# Patient Record
Sex: MALE
[De-identification: names, ages, dates, MRNs, and addresses within clinical notes are randomized per-mention and may not be internally consistent; named-entity substitution may affect disease eponyms.]

---

## 2018-03-26 ENCOUNTER — HOSPITAL ENCOUNTER (INPATIENT)
Dept: HOSPITAL 12 - SRC | Age: 58
LOS: 5 days | Discharge: OTHER INSTITUTION | DRG: 982 | End: 2018-03-31
Attending: INTERNAL MEDICINE | Admitting: INTERNAL MEDICINE
Payer: COMMERCIAL

## 2018-03-26 VITALS — HEIGHT: 72 IN | BODY MASS INDEX: 31.15 KG/M2 | WEIGHT: 230 LBS

## 2018-03-26 DIAGNOSIS — R29.6: ICD-10-CM

## 2018-03-26 DIAGNOSIS — M71.572: ICD-10-CM

## 2018-03-26 DIAGNOSIS — M45.9: ICD-10-CM

## 2018-03-26 DIAGNOSIS — F41.9: ICD-10-CM

## 2018-03-26 DIAGNOSIS — E86.0: ICD-10-CM

## 2018-03-26 DIAGNOSIS — Z79.01: ICD-10-CM

## 2018-03-26 DIAGNOSIS — E83.42: ICD-10-CM

## 2018-03-26 DIAGNOSIS — Z86.718: ICD-10-CM

## 2018-03-26 DIAGNOSIS — G89.29: ICD-10-CM

## 2018-03-26 DIAGNOSIS — F10.239: Primary | ICD-10-CM

## 2018-03-26 DIAGNOSIS — K46.9: ICD-10-CM

## 2018-03-26 DIAGNOSIS — E87.1: ICD-10-CM
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
## 2018-03-26 DIAGNOSIS — I15.9: ICD-10-CM

## 2018-03-26 DIAGNOSIS — F32.9: ICD-10-CM

## 2018-03-26 DIAGNOSIS — L85.9: ICD-10-CM

## 2018-03-26 DIAGNOSIS — K58.0: ICD-10-CM

## 2018-03-26 DIAGNOSIS — G47.00: ICD-10-CM

## 2018-03-26 DIAGNOSIS — M10.9: ICD-10-CM

## 2018-03-26 DIAGNOSIS — Z82.62: ICD-10-CM

## 2018-03-26 DIAGNOSIS — Z81.1: ICD-10-CM

## 2018-03-26 DIAGNOSIS — Z59.1: ICD-10-CM

## 2018-03-26 DIAGNOSIS — M06.9: ICD-10-CM

## 2018-03-26 DIAGNOSIS — L97.429: ICD-10-CM

## 2018-03-26 DIAGNOSIS — L85.3: ICD-10-CM

## 2018-03-26 LAB
ALP SERPL-CCNC: 85 U/L (ref 50–136)
ALT SERPL W/O P-5'-P-CCNC: 31 U/L (ref 16–63)
AMPHETAMINES UR QL SCN>1000 NG/ML: NEGATIVE
AMYLASE SERPL-CCNC: 61 U/L (ref 25–115)
AST SERPL-CCNC: 29 U/L (ref 15–37)
BARBITURATES UR QL SCN: POSITIVE
BASOPHILS # BLD AUTO: 0.1 K/UL (ref 0–8)
BASOPHILS NFR BLD AUTO: 0.7 % (ref 0–2)
BILIRUB SERPL-MCNC: 0.9 MG/DL (ref 0.2–1)
BUN SERPL-MCNC: 15 MG/DL (ref 7–18)
CHLORIDE SERPL-SCNC: 97 MMOL/L (ref 98–107)
CO2 SERPL-SCNC: 27 MMOL/L (ref 21–32)
COCAINE UR QL SCN: NEGATIVE
CREAT SERPL-MCNC: 1 MG/DL (ref 0.6–1.3)
EOSINOPHIL # BLD AUTO: 0.2 K/UL (ref 0–0.7)
EOSINOPHIL NFR BLD AUTO: 1.8 % (ref 0–7)
ETHANOL SERPL-MCNC: < 3 MG/DL (ref 0–0)
GLUCOSE SERPL-MCNC: 95 MG/DL (ref 74–106)
HCT VFR BLD AUTO: 40.1 % (ref 36.7–47.1)
HGB BLD-MCNC: 13.6 G/DL (ref 12.5–16.3)
LYMPHOCYTES # BLD AUTO: 0.7 K/UL (ref 20–40)
LYMPHOCYTES NFR BLD AUTO: 8.8 % (ref 20.5–51.5)
MAGNESIUM SERPL-MCNC: 1.6 MG/DL (ref 1.8–2.4)
MCH RBC QN AUTO: 33.9 UUG (ref 23.8–33.4)
MCHC RBC AUTO-ENTMCNC: 34 G/DL (ref 32.5–36.3)
MCV RBC AUTO: 99.9 FL (ref 73–96.2)
MONOCYTES # BLD AUTO: 0.7 K/UL (ref 2–10)
MONOCYTES NFR BLD AUTO: 8.3 % (ref 0–11)
NEUTROPHILS # BLD AUTO: 6.8 K/UL (ref 1.8–8.9)
NEUTROPHILS NFR BLD AUTO: 80.4 % (ref 38.5–71.5)
OPIATES UR QL SCN: NEGATIVE
PCP UR QL SCN>25 NG/ML: NEGATIVE
PLATELET # BLD AUTO: 160 K/UL (ref 152–348)
POTASSIUM SERPL-SCNC: 3.6 MMOL/L (ref 3.5–5.1)
RBC # BLD AUTO: 4.01 MIL/UL (ref 4.06–5.63)
THC UR QL SCN>50 NG/ML: NEGATIVE
URATE SERPL-MCNC: 6.1 MG/DL (ref 3.5–7.2)
WBC # BLD AUTO: 8.5 K/UL (ref 3.6–10.2)
WS STN SPEC: 8 G/DL (ref 6.4–8.2)

## 2018-03-26 PROCEDURE — A4663 DIALYSIS BLOOD PRESSURE CUFF: HCPCS

## 2018-03-26 PROCEDURE — HZ2ZZZZ DETOXIFICATION SERVICES FOR SUBSTANCE ABUSE TREATMENT: ICD-10-PCS | Performed by: INTERNAL MEDICINE

## 2018-03-26 PROCEDURE — G0480 DRUG TEST DEF 1-7 CLASSES: HCPCS

## 2018-03-26 RX ADMIN — Medication ONE MG: at 23:15

## 2018-03-26 RX ADMIN — Medication SCH EA: at 21:45

## 2018-03-26 RX ADMIN — ACETAMINOPHEN PRN MG: 325 TABLET ORAL at 21:45

## 2018-03-26 RX ADMIN — Medication ONE MG: at 23:33

## 2018-03-26 SDOH — ECONOMIC STABILITY - HOUSING INSECURITY: INADEQUATE HOUSING: Z59.1

## 2018-03-26 NOTE — NUR
PRN BENADRYL AND MOTRIN ADMINISTRATION



Pt requests sleep aid and reports "pain all over," 8/10 r/t chronic and acute pains from rib 
injury, back injury, arthritis. Safety measures in place. Call light within reach. Will 
continue to monitor.

-------------------------------------------------------------------------------

Addendum: 03/27/18 at 0640 by JENNIFER CHUA RN

-------------------------------------------------------------------------------

CORRECTION:TYLENOL ADMINISTRATION NOT MOTRIN

## 2018-03-26 NOTE — NUR
PRE ADMISSION NOTE



Pt is a 59 y/o male seen at intake, A & O x 4,  and presents with anxiety, agitation, 
restlessness, tremors, sweats, flushed skin, generalized pain r/t chronic and acute medical 
conditions 8/10, visual disturbances, slumped posture, disheveled appearance and flat 
affect. Pt is ambulatory with cane for assistance. Pt reports PMH of anxiety, depression, 
bipolar disorder, HTN, IBS, gout, venous thromboembolic disease, rheumatoid arthritis, 10th 
rib fx (10 days ago), umbilical hernia, spondylosis with history of left shoulder repair x 
2, pneumonia, stomach ulcers, back injury and L5/S1 spinal surgery. Pt has history of falls, 
last being in December 2017. Vitals signs are as follows: 144/93, , RR 18, 02 95%, 
Pain 8/10. Pt denies seizure hx and reports NKA. Pt arrived from a treatment facility for 
medically supervised detox. Pt brought home medications, instructed patient that any 
controlled substances will not be given back, pt verbalized understanding. 



Educated patient rules and policies of the unit including handling of contraband and taking 
vital signs Q4H. Will continue care of patient upon arrival on unit.

## 2018-03-26 NOTE — NUR
PRN BENADRYL AND MOTRIN REASSESSMENT



Pt laying in bed with eyes closed, medications noted effective. Respirations even and 
unlabored. Safety measures in place. Call light within reach. Will continue to monitor.

-------------------------------------------------------------------------------

Addendum: 03/27/18 at 0640 by JENNIFER CHUA RN

-------------------------------------------------------------------------------

CORRECTION:TYLENOL ADMINISTRATION NOT MOTRIN

## 2018-03-26 NOTE — NUR
ADMISSION NOTE



Pt is a 59 y/o male arrived on the unit at 2036 on 03/26/18 for medically supervised 
withdrawal from ETOH (Vodka/Tequila). Pt reports his last drink was on Friday 03/23/18 and 
is currently in withdrawal. Pt reports he was at a treatment facility for 2.5 days and was 
started on an Ativan taper, but d/t worsening withdrawal, diarrhea and unstable gait was 
recommended to a medially supervised detox and therefore transferred to this facility today. 




Substance Use History:

1. Vodka or Tequila 750 ml daily, last intake of 750 ml on 03/23/18, at this rate for 4 
years. 



Pt is A&Ox4, speech is slow and lethargic. Pt appears disheveled and umkempt with poor eye 
contact and presents with anxiety, agitation, restlessness, tremors, difficulty 
concentrating, sweats, flushed skin, tachycardia, pain everywhere 8/10, visual disturbances 
and flat affect. Pt unable to state exact S/S of withdrawal r/t difficulty concentrating, 
stated "I just feel hungover."



Vitals signs are as follows: /93, , RR 18, 02 95%, Pain 8/10. Pulse regular but 
tachycardic. Respirations even and unlabored. Lung sounds clear. Bowel sound active in all 4 
quadrants. Pt has distended abdomen with umbilical hernia. Pt reports having diarrhea, last 
BM this morning. Pt has dry/cracked skin on left heel. 



Pt is full code, NKA, regular diet and on fall/seizure precautions. Skin and body check 
completed, skin intact and no contraband bound. Initial CIWA 12. Pt is 61 and weighs 230 
lbs. Pt not candidate for MRSA, denies being hospitalized or in [facility] in past 30 days. Pt 
reports smoking 3 cigars daily, denies smoking cigarettes, denies interest in smoking 
cessation at this time. Pt refuses pneumonia vaccine. Denies family medical or substance use 
history.



Pt reports his longest sober period was 2 days, approximately 4 years ago. Pt reports going 
to [facility] treatment facility at unknown date and treatment facility with unknown name in 
[place] for 2.5 days prior to arrival. Pt states he has a limited support system and that his 
wife  from him in September 2017. Pt has no children or friends for support. Pt 
lives in his car. Pt is on disability and is unemployed. Pt states "I don't want to be 
living like this anymore...I also want my wife back." Pt states he is "ready to get fully 
detoxed and continue with further treatment to help." Pt states he has been drinking daily 
r/t his recent marriage separation, cravings and chronic and acute pains. 



Pts primary care doctor is Dr. Terrence Betancur, cardiologist Dr. Giovanny Kamara and his 
rheumatologist is Dr. Joel Hirschberg. Pt reports PMH of anxiety, depression, bipolar 
disorder, HTN, IBS, gout, insomnia, venous thromboembolic disease, rheumatoid arthritis, 
10th rib fx (10 days ago), umbilical hernia, spondylosis with history of left shoulder 
repair x 2, pneumonia, stomach ulcers, back injury and L5/S1 spinal surgery. Pt had 
outpatient treatment for rib fx 10 days prior to admission. Pt has history of falls, last 
being in December 2017. Pt takes the following home medications: Tylenol, Amlopdipine, 
Chlorthalidone, Benadryl, Doxycycline monohydrate, Folic Acid, Gabapentin, Hydroxyzine 
Pamoate, Methocarbomol succinate, Olanzapine, Ondansetron, Predisone, Thiamine Hcl, 
Trazodone, Valsartan, Venlafaxine Hcl, Vitamin K2 and Warfarin. 


-------------------------------------------------------------------------------

Addendum: 03/27/18 at 0646 by JENNIFER CHUA RN

-------------------------------------------------------------------------------

Additional information:



Pt reports history of pulmonary embolism and DVT in right thigh approximately 8 years ago. 
Pt placed on 1:1 for fall precautions/safety. Stool culture sample to be obtained per 
orders. VTE pumps in place per orders.

## 2018-03-27 VITALS — SYSTOLIC BLOOD PRESSURE: 172 MMHG | DIASTOLIC BLOOD PRESSURE: 112 MMHG

## 2018-03-27 VITALS — SYSTOLIC BLOOD PRESSURE: 145 MMHG | DIASTOLIC BLOOD PRESSURE: 97 MMHG

## 2018-03-27 VITALS — SYSTOLIC BLOOD PRESSURE: 142 MMHG | DIASTOLIC BLOOD PRESSURE: 90 MMHG

## 2018-03-27 VITALS — DIASTOLIC BLOOD PRESSURE: 91 MMHG | SYSTOLIC BLOOD PRESSURE: 140 MMHG

## 2018-03-27 VITALS — DIASTOLIC BLOOD PRESSURE: 85 MMHG | SYSTOLIC BLOOD PRESSURE: 136 MMHG

## 2018-03-27 VITALS — DIASTOLIC BLOOD PRESSURE: 70 MMHG | SYSTOLIC BLOOD PRESSURE: 149 MMHG

## 2018-03-27 RX ADMIN — WARFARIN SODIUM SCH MG: 5 TABLET ORAL at 16:27

## 2018-03-27 RX ADMIN — Medication SCH EA: at 20:08

## 2018-03-27 RX ADMIN — Medication PRN GM: at 20:09

## 2018-03-27 RX ADMIN — CHLORTHALIDONE SCH MG: 25 TABLET ORAL at 12:46

## 2018-03-27 RX ADMIN — Medication SCH EA: at 14:03

## 2018-03-27 RX ADMIN — ACETAMINOPHEN PRN MG: 325 TABLET ORAL at 20:08

## 2018-03-27 RX ADMIN — ACETAMINOPHEN PRN MG: 325 TABLET ORAL at 06:28

## 2018-03-27 RX ADMIN — LORAZEPAM SCH MG: 1 TABLET ORAL at 12:46

## 2018-03-27 RX ADMIN — LORAZEPAM SCH MG: 1 TABLET ORAL at 16:25

## 2018-03-27 RX ADMIN — FOLIC ACID SCH MG: 1 TABLET ORAL at 08:17

## 2018-03-27 RX ADMIN — Medication SCH EA: at 10:11

## 2018-03-27 RX ADMIN — Medication SCH MG: at 08:16

## 2018-03-27 RX ADMIN — OLANZAPINE SCH MG: 2.5 TABLET ORAL at 20:08

## 2018-03-27 RX ADMIN — AMLODIPINE BESYLATE SCH MG: 10 TABLET ORAL at 20:09

## 2018-03-27 RX ADMIN — VALSARTAN SCH MG: 160 TABLET ORAL at 12:46

## 2018-03-27 RX ADMIN — THERA TABS SCH UDTAB: TAB at 08:17

## 2018-03-27 NOTE — NUR
PRN HYDRALAZINE

Patient's blood pressure noted 172/112, HR-100, PRN Hydralazine 50mg PO given as ordered. 
Will cont to monitor and reassess the pt.

## 2018-03-27 NOTE — NUR
START OF SHIFT NOTE

Received report from night nurse, 58 year old admitted for ETOH withdrawal. Patient cont on 
Ativan taper tolerated well. Per endorsement patient was given PRN Benadryl, Motrin, 
effective per night nurse. Last CIWA was 7, slept for 6 hours. VTE score 5 and VTE pumps on 
place. Patient cont on 1:1 for safety. Received patient alert awake anxious, agitated, body 
aches, nausea, swats. Patient scheduled for routine medications.  Educated patient with 
current plan care of the care and medication regimen and importance of attending groups and 
activities with good verbal understanding. Safety measures in place. Will cont with plan of 
care.

## 2018-03-27 NOTE — NUR
PRN HYDRALAZINE

Patient's blood pressure noted 183/94, HR-110. PRN Hydralazine 75mg PO given as ordered. 
Will cont to monitor and reassess.

## 2018-03-27 NOTE — NUR
PRN EUCERIN CREAM AND TYLENOL ADMINISTRATION



Pt requests Eucerin cream for dry/cracked heel and reports pain 8/10 in lower back. Safety 
measures in place. Call light within reach. Will continue to monitor.

## 2018-03-27 NOTE — NUR
NEW ORDER

Patient noted o2 desaturation between 90% to 93% MD notified and ordered O2 2L/min via nasal 
cannula. Administered oxygen as ordered and noted with 96% O2 sat.

## 2018-03-27 NOTE — NUR
PRN TYLENOL ADMINISTRATION



Pt reports pain 7/10 "all over." Safety measures in place. 1:1 sitter at bedside. Will 
continue to monitor.

## 2018-03-27 NOTE — NUR
PT EVAL

MD order PT Eval for unsteady gait. Patient remains on 1:1 for safety at this time. Safety 
measures in place.

## 2018-03-27 NOTE — NUR
END OF SHIFT NOTE

Gave report to night nurse, 58 year old male admitted for ETOH withdrawal. Patient is on 
Ativan taper tolerating well. Patient presented with anxiety, agitation, tremors, elevated 
blood pressure. during shift patient was given PRN hydralazine x2 noted to be effective. 
patient was seen and evaluated by PT, and cont with 1:1 for safety. Patient rested in his 
room most of the time. Patient was encouraged to participates in groups therapy session. 
Encourage diversional activities to alleviate anxiety. Patient denies any SI/HI. Safety 
measures in place. Patient endorsed to night nurse in stable condition.

## 2018-03-27 NOTE — NUR
START OF SHIFT



Pt is a 57 y/o male admitted on 03/26/18 for ETOH withdrawal. Pt started a 5 day Ativan 
taper on 03/26/18, tolerating well. Per day shift nurse, last CIWA 11 and PRN Hydralazine 
administered. Pt was placed on 02 nasal cannula 2L per order d/t SP02 90-93% on RA, with 02 
therapy SP02 remains at 96-97%. Denies SOB. Upon assessment, pt presents with disheveled and 
umkempt appearance, poor eye contact, anxiety, agitation, restlessness, tremors, difficulty 
concentrating, sweats, flushed skin, tachycardia, increased BP, pain everywhere 8/10, visual 
hallucinations and flat affect. Pt A&Ox4 and denies auditory hallucinations. Stool sample 
results and PT eval pending. Pt is ambulatory with cane for assistance with unsteady gait. 
Pt placed on 1:1 for fall precautions/safety. Pt has dry/cracked skin on left heel, requests 
cream for skin. Swelling present in bilateral feet, elevated feet. VTE score 5, VTE pumps in 
place and on Warfarin. Medications due. Safety measures in place. 1:1 sitter at bedside. 
Will continue to monitor.

## 2018-03-27 NOTE — NUR
HYDRALAZINE REASSESSMENT

Blood pressure noted 141/82, HR-98, O2 sat -96% Hydralazine noted to be effective.

## 2018-03-27 NOTE — NUR
END OF SHIFT



Pt is a 59 y/o male admitted on 03/26/18 for ETOH withdrawal. Pt started a 5 day Ativan 
taper on 03/26/18, tolerated first dose well. Pt presented with disheveled and umkempt 
appearance, poor eye contact, anxiety, agitation, restlessness, tremors, difficulty 
concentrating, sweats, flushed skin, tachycardia, increased BP, pain everywhere 8/10, visual 
disturbances and flat affect. Pt reports having diarrhea while at the treatment facility he 
was transferred from, last BM on 03/26/18. Stool sample to be collected per orders. Pt is 
ambulatory with cane for assistance with unsteady gait. Pt placed on 1:1 for fall 
precautions/safety. Pt reported having diarrhea, last BM yesterday. Pt has dry/cracked skin 
on left heel. Swelling present in bilateral feet, elevated feet while sleeping. VTE score 5, 
VTE pumps ordered and placed and on Warfarin. Mg 1.6, replaced with 800 mg of Mag Ox. 
Scheduled medications and PRN Benadryl and Tylenol x 2 administered, effective in S/S of 
withdrawal AEB CIWA 12 lowered to 7 during shift. Pt slept 6 hours. Intake 500 ml, void x 2, 
stool x 0. Safety measures in place. 1:1 sitter at bedside. Pts needs have been met. 
Endorsed to day shift nurse.

## 2018-03-27 NOTE — NUR
Nursing note

Pt has an opening on his left heel. Pt reports that it started as a crack from dry skin and 
became bigger. The crack is deep, dry, with brown tissue on the edges. Contacted Dr. Marie 
with orders to call for podiatry consult. Contacted Dr. Coates's office. He will see the 
patient today per his .

## 2018-03-27 NOTE — NUR
CIWA DEFERRED



Pt laying in bed with eyes closed, CIWA deferred, to be assessed when pt is awake per 
orders. Respirations even and unlabored. Safety measures in place. 1:1 sitter at bedside. 
Will continue to monitor.

## 2018-03-28 VITALS — SYSTOLIC BLOOD PRESSURE: 125 MMHG | DIASTOLIC BLOOD PRESSURE: 90 MMHG

## 2018-03-28 VITALS — DIASTOLIC BLOOD PRESSURE: 85 MMHG | SYSTOLIC BLOOD PRESSURE: 155 MMHG

## 2018-03-28 VITALS — DIASTOLIC BLOOD PRESSURE: 101 MMHG | SYSTOLIC BLOOD PRESSURE: 152 MMHG

## 2018-03-28 VITALS — DIASTOLIC BLOOD PRESSURE: 91 MMHG | SYSTOLIC BLOOD PRESSURE: 156 MMHG

## 2018-03-28 VITALS — DIASTOLIC BLOOD PRESSURE: 92 MMHG | SYSTOLIC BLOOD PRESSURE: 145 MMHG

## 2018-03-28 VITALS — SYSTOLIC BLOOD PRESSURE: 133 MMHG | DIASTOLIC BLOOD PRESSURE: 88 MMHG

## 2018-03-28 PROCEDURE — HZ31ZZZ INDIVIDUAL COUNSELING FOR SUBSTANCE ABUSE TREATMENT, BEHAVIORAL: ICD-10-PCS

## 2018-03-28 RX ADMIN — OLANZAPINE SCH MG: 2.5 TABLET ORAL at 20:49

## 2018-03-28 RX ADMIN — ALUMINUM HYDROXIDE, MAGNESIUM HYDROXIDE, AND SIMETHICONE PRN ML: 200; 200; 20 SUSPENSION ORAL at 20:49

## 2018-03-28 RX ADMIN — METOPROLOL SUCCINATE SCH MG: 23.75 TABLET, FILM COATED, EXTENDED RELEASE ORAL at 20:50

## 2018-03-28 RX ADMIN — VALSARTAN SCH MG: 160 TABLET ORAL at 09:11

## 2018-03-28 RX ADMIN — ALUMINUM HYDROXIDE, MAGNESIUM HYDROXIDE, AND SIMETHICONE PRN ML: 200; 200; 20 SUSPENSION ORAL at 16:28

## 2018-03-28 RX ADMIN — FOLIC ACID SCH MG: 1 TABLET ORAL at 09:08

## 2018-03-28 RX ADMIN — Medication SCH EA: at 09:09

## 2018-03-28 RX ADMIN — LORAZEPAM SCH MG: 1 TABLET ORAL at 09:08

## 2018-03-28 RX ADMIN — WARFARIN SODIUM SCH MG: 5 TABLET ORAL at 16:30

## 2018-03-28 RX ADMIN — ACETAMINOPHEN PRN MG: 325 TABLET ORAL at 12:23

## 2018-03-28 RX ADMIN — Medication SCH EA: at 20:49

## 2018-03-28 RX ADMIN — CHLORTHALIDONE SCH MG: 25 TABLET ORAL at 09:09

## 2018-03-28 RX ADMIN — ALUMINUM HYDROXIDE, MAGNESIUM HYDROXIDE, AND SIMETHICONE PRN ML: 200; 200; 20 SUSPENSION ORAL at 09:18

## 2018-03-28 RX ADMIN — ACETAMINOPHEN PRN MG: 325 TABLET ORAL at 04:47

## 2018-03-28 RX ADMIN — LORAZEPAM SCH MG: 1 TABLET ORAL at 14:29

## 2018-03-28 RX ADMIN — Medication SCH EA: at 20:50

## 2018-03-28 RX ADMIN — THERA TABS SCH UDTAB: TAB at 09:08

## 2018-03-28 RX ADMIN — Medication SCH EA: at 14:29

## 2018-03-28 RX ADMIN — AMLODIPINE BESYLATE SCH MG: 10 TABLET ORAL at 20:49

## 2018-03-28 RX ADMIN — Medication SCH MG: at 09:07

## 2018-03-28 NOTE — NUR
CIWA DEFERRED



Pt laying in bed with eyes closed, CIWA deferred, to be assessed when pt is awake per 
orders. Respirations even and unlabored. Safety measures in place. Call light within reach. 
Will continue to monitor.

## 2018-03-28 NOTE — NUR
PRN HYDRALAZINE, ATIVAN 1 MG, TYLENOL REASSESSMENT



/89 HR 90. CIWA lowered to 8, pt laying in bed and presents with anxiety, 
restlessness, agitation, visual disturbances. Denies auditory hallucinations. A&Ox4. Pt 
reports improvement in lower back pain to 5/10. Safety measures in place. Call light within 
reach. Will continue to monitor.

-------------------------------------------------------------------------------

Addendum: 03/28/18 at 0648 by JENNIFER CHUA RN

-------------------------------------------------------------------------------

CORRECTION: PATIENT HAS IMPROVEMENT IN ANXIETY, AGITATION AND VISUAL DISTURBANCES.

## 2018-03-28 NOTE — NUR
PRN/MAALOX



PT IN PAIN FROM HEARTBURN WITH FACIAL GRIMACING AND TOUCHING HIS STOMACH AREA. MAALOX GIVEN, 
WILL MONITOR FOR EFFECTIVENESS.

## 2018-03-28 NOTE — NUR
Start of Shift Nurse

Received a 59 y/o male px, admitted for medically supervised withdrawals from ETOH. Px was 
placed on 5 day Ativan taper, started on 03/26/2018. Px is tolerating well. Last reported 
CIWA 12 by AM shift nurse. Px is on 1 to 1 for unsteady gait. During the rounds at 2000, px 
is awake on bed in fowlers position. Px is on intermittent compression device. Px looks 
anxious, and disheveled. Unfinished drinks noted on top of bed side table. Px stated "I have 
pain on my back 7/10". "My anxiety is 5/10". "Can I have Maalox tonight?" Bed on lowest 
position, side rails up 2x and call light within reach. We'll continue to monitor.

## 2018-03-28 NOTE — NUR
REASSESSMENT



PT APPEARS CALM AND SLEEPING AT THIS TIME. WILL CONTINUE TO MONITOR AND PROVIDE SUPPORT.

## 2018-03-28 NOTE — NUR
Reassessment of heartburns

Px stated that he doesn't have heartburns after an hour of administration of Maalox.

## 2018-03-28 NOTE — NUR
PRN HYDRALAZINE AND TYLENOL ADMINISTRATION



Pt reports pain in lower back 8/10. /101 HR 95, orders to give Hydralazine 75 mg. 
Safety measures in place. Call light within reach. Will continue to monitor. 

-------------------------------------------------------------------------------

Addendum: 03/28/18 at 0647 by JENNIFER CHUA RN

-------------------------------------------------------------------------------

PRN ATIVAN 1 MG ADMINISTERED AT SAME TIME



CIWA 9, orders to give PRN Ativan 1 mg. Pt presents with anxiety, agitation, restlessness 
and visual disturbances.

## 2018-03-28 NOTE — NUR
END OF SHIFT



PT LAST CIWA IS 12 @1600. PT REMAINS ON A O2 NC 2L TITRATED TO KEEP O2 SAT 95% AND ABOVE. PT 
REMAINS ON A 1:1 FOR UNSTEADY GAIT AND HAS SCDS WHILE IN BED FOR VTE 5. PT HAS HAD MAALOX X2 
AND TYLENOL PRNS DURING SHIFT. PT HAS HAD FINAL GAIT TRAINING WITH PT AND INTRUCTIONS EVEN 
TO PT ON HOW TO STAND UP FROM BED AND WALK SAFELY. PT % OF MEALS. PT HAD DEBRIBEMENT 
PROCEDURE DONE BY PODIATRIST DURING SHIFT. PT HAS TAKEN A SHOWER TODAY ON SHIFT. SAFETY 
MEASURES IN PLACE. WILL ENDORSE TO NIGHT SHIFT NURSE.

## 2018-03-28 NOTE — NUR
PRN Maalox 

Px requested for Maalox. Px stated "I am having heartburns after I take a bunch of pills, 
will you give me Maalox?" Maalox 30 ml given PO. We'll continue to monitor.

## 2018-03-28 NOTE — NUR
START OF SHIFT



PT IS A 59 Y/O M ADMITTED ON 3/26/18 FOR MEDICALLY SUPERVISED ETOH WITHDRAWAL. PT IS PLACED 
ON A 5  DAY ATIVAN TAPER AND TOLERATING WELL. PT IS ON A 1:1 FOR UNSTEADY GAIT AND SAFETY. 
PT IS ON O2 2L NC ORDERED TO TITRATE TO MAINTAIN O2 SAT 95% AND ABOVE. PT DENIES SOB. PT IS 
A/OX4, PRESENTS FACIAL FLUSHING, ANXIETY, AGITATION, RESTLESSNESS, TACHYCARDIA, DIAPHORESIS, 
TREMORS, LOW BACK PAIN 5/10, DIFFICULTLY CONCENTRATING, DIFFICULTY THINKING, REPORTS FEELING 
WINDED WHEN WALKING. PT REPORTS SEEING "TINY MIDGETS ON HIS FEET" AND JOKES ABOUT HIS 
HALLUCINATIONS BUT AWARE THEY ARE NOT REAL. PT REPORTS HAVING TACTILE DISTURBANCES; PINS AND 
NEEDLES SENSATIONS ON HIS HANDS AND FEET. SCDS WORN AND WORKING. PT HAS CRACKED LEFT HEEL 
AND IS TO HAVE DEBRIBEMENT PROCEDURE TODAY BY A PODIATRIST. SIDE RAILS UPX2, BED IS IN 
LOWEST POSITION. CALL LIGHT WITHIN REACH. SAFETY MEASURES IN PLACE. WILL CONTINUE TO 
MONITOR.

## 2018-03-28 NOTE — NUR
END OF SHIFT



Pt is a 59 y/o male admitted on 03/26/18 for ETOH withdrawal. Pt started a 5 day Ativan 
taper on 03/26/18, tolerating well. Pt placed on 02 nasal cannula 2L per order d/t SP02 
90-93% on RA, with 02 therapy SP02 remains at 96-97%. Denied SOB throughout shift. Pt 
presented with disheveled and umkempt appearance, poor eye contact, anxiety, agitation, 
restlessness, tremors, difficulty concentrating, sweats, flushed skin, tachycardia, 
increased BP, lower back pain 8/10, visual hallucinations and flat affect. Pt A&Ox4 and 
denies auditory hallucinations. Pt uses cane for assistance and has unsteady gait. Pt placed 
on 1:1 for fall precautions/safety. Pt has dry/cracked skin on left heel, Eucerin cream 
administered. VTE pumps in place and on Warfarin. Scheduled medications and PRN Tylenol x 2 
and hydralazine administered, effective S/S of withdrawal AEB CIWA 11 lowered to CIWA 8 
during shift.  Pt slept 8 hours. Intake of 1050 ml, void x 2, stool x 0. Safety measures in 
place. Call light within reach. Pts needs have been met. Endorsed to day shift nurse.

## 2018-03-28 NOTE — NUR
REASSESSMENT 



PT REPORTED PAIN DECREASED TO 5/10. PT STATES WALKING IN THE HALLWAYS INCREASED PAIN BUT NOW 
IS BETTER. WILL CONTINUE TO MONITOR.

## 2018-03-29 VITALS — DIASTOLIC BLOOD PRESSURE: 84 MMHG | SYSTOLIC BLOOD PRESSURE: 148 MMHG

## 2018-03-29 VITALS — DIASTOLIC BLOOD PRESSURE: 66 MMHG | SYSTOLIC BLOOD PRESSURE: 132 MMHG

## 2018-03-29 VITALS — DIASTOLIC BLOOD PRESSURE: 101 MMHG | SYSTOLIC BLOOD PRESSURE: 143 MMHG

## 2018-03-29 VITALS — DIASTOLIC BLOOD PRESSURE: 87 MMHG | SYSTOLIC BLOOD PRESSURE: 136 MMHG

## 2018-03-29 VITALS — SYSTOLIC BLOOD PRESSURE: 133 MMHG | DIASTOLIC BLOOD PRESSURE: 77 MMHG

## 2018-03-29 VITALS — SYSTOLIC BLOOD PRESSURE: 132 MMHG | DIASTOLIC BLOOD PRESSURE: 94 MMHG

## 2018-03-29 PROCEDURE — 0JBR0ZZ EXCISION OF LEFT FOOT SUBCUTANEOUS TISSUE AND FASCIA, OPEN APPROACH: ICD-10-PCS

## 2018-03-29 RX ADMIN — METOPROLOL SUCCINATE SCH MG: 23.75 TABLET, FILM COATED, EXTENDED RELEASE ORAL at 21:09

## 2018-03-29 RX ADMIN — Medication SCH EA: at 08:12

## 2018-03-29 RX ADMIN — LORAZEPAM SCH MG: 1 TABLET ORAL at 14:33

## 2018-03-29 RX ADMIN — TRAZODONE HYDROCHLORIDE PRN MG: 50 TABLET ORAL at 21:08

## 2018-03-29 RX ADMIN — METHOCARBAMOL TABLETS PRN MG: 750 TABLET, COATED ORAL at 15:39

## 2018-03-29 RX ADMIN — VALSARTAN SCH MG: 160 TABLET ORAL at 08:13

## 2018-03-29 RX ADMIN — Medication SCH MG: at 08:11

## 2018-03-29 RX ADMIN — ALUMINUM HYDROXIDE, MAGNESIUM HYDROXIDE, AND SIMETHICONE PRN ML: 200; 200; 20 SUSPENSION ORAL at 08:17

## 2018-03-29 RX ADMIN — WARFARIN SODIUM SCH MG: 5 TABLET ORAL at 16:56

## 2018-03-29 RX ADMIN — FOLIC ACID SCH MG: 1 TABLET ORAL at 08:11

## 2018-03-29 RX ADMIN — ACETAMINOPHEN PRN MG: 325 TABLET ORAL at 03:03

## 2018-03-29 RX ADMIN — LORAZEPAM SCH MG: 1 TABLET ORAL at 21:08

## 2018-03-29 RX ADMIN — ALUMINUM HYDROXIDE, MAGNESIUM HYDROXIDE, AND SIMETHICONE PRN ML: 200; 200; 20 SUSPENSION ORAL at 21:22

## 2018-03-29 RX ADMIN — THERA TABS SCH UDTAB: TAB at 08:11

## 2018-03-29 RX ADMIN — Medication SCH EA: at 14:33

## 2018-03-29 RX ADMIN — METHOCARBAMOL TABLETS PRN MG: 750 TABLET, COATED ORAL at 21:08

## 2018-03-29 RX ADMIN — OLANZAPINE SCH MG: 2.5 TABLET ORAL at 21:08

## 2018-03-29 RX ADMIN — Medication SCH EA: at 21:09

## 2018-03-29 RX ADMIN — Medication PRN GM: at 16:59

## 2018-03-29 RX ADMIN — CHLORTHALIDONE SCH MG: 25 TABLET ORAL at 08:12

## 2018-03-29 RX ADMIN — ACETAMINOPHEN PRN MG: 325 TABLET ORAL at 15:20

## 2018-03-29 RX ADMIN — AMLODIPINE BESYLATE SCH MG: 10 TABLET ORAL at 21:08

## 2018-03-29 NOTE — NUR
PRN Tylenol

Px complained of H/A of 6/10. Tylenol 325 mg/tab, 2 tabs given PO. To reassess after an 
hour.

## 2018-03-29 NOTE — NUR
STOOL CULTURE, C.DIFFICILE TOX                                          



STOOL CULTURE  Final  NO SALMONELLA OR SHIGELLA ISOLATED

## 2018-03-29 NOTE — NUR
PRN Reassessment

Patient verbalized relief from heartburn after medication administration. Patient stable at 
this time. safety measures in place. Will continue to monitor patient.

## 2018-03-29 NOTE — NUR
Debridement of callus and/or underlying ulcerations and signs of infection L heel performed 
by Dr Cartagena. Wound culture specimen collected. 

If dressing gets wet, to reinforce it. Tomorrow change dressing and apply lidocaine, cover 
with gauze, and wrap with Kerlix once a day. CN notified.

## 2018-03-29 NOTE — NUR
PRN Reassessment

Patient verbalized decreased in pain from 6/10 to 3/10 after medication administration. 
Patient still awake at this time and appears comfortable. Safety measures in place. Will 
continue to monitor patient.

## 2018-03-29 NOTE — NUR
START OF SHIFT

Rcvd endorse from ongoing nurse, client is in bed, a/o x 4. client presents with anxious 
mood, flat affect, flushed face, tremors, and clammy skin. Client report restless legs, 
generalized body aches, irritability, headache, chills, decreased appetite, mild nausea, and 
fatigue. Client had an uneventful night, slept 5 hrs. Client is on 1:1 sitter for safety. 
Encourage client to attend group therapy to learn skills to maintain sober. Encourage client 
to increase PO fluid as tolerated to facilitate detox. L heel with dry skin, client is 
schedule for debridment of callus, consent sign and file in chart. Client is on 5 day Ativan 
taper.  Last  CIWA 11 @ 0400. Call light within reach. Will continue to monitor.

## 2018-03-29 NOTE — NUR
END OF SHIFT

Endorse client to incoming nurse, client is in bed, a/o x 4. client continues to presents 
with anxious mood, flat affect, and clammy skin. Client report restless legs, chills, and 
decreased appetite. PRN's given and noted per protocol, see emar. Client continues to be on 
1:1 sitter. Client was not compliant with group therapy. Last CIWA 14 @ 1600. Client 
consumed ~75% of meals. Adequate PO fluid intake 2100mL, void x 5, stool x1. Call light 
within reach. Will continue to monitor.

## 2018-03-29 NOTE — NUR
CIWA deferred

CIWA deferred due to the px is asleep at 0000 and 0400, to assess if the px awake per 
doctor's order. We'll continue to monitor.

## 2018-03-29 NOTE — NUR
PRN Maalox

Patient complained of heartburn. PRN Maalox administered as ordered. Will continue to 
monitor.

## 2018-03-29 NOTE — NUR
Start of Shift Note:

Patient is a 58 y.o male admitted on 03/26/18 for medically supervised withdrawal from ETOH. 
Patient is alert & oriented x4. Patient noted with an anxious/irritable mood, has flushed 
and clammy skin. Patient presented with 6/10 generalized body aches, fine tremors and has 
anxiety. Patient uses a cane to ambulate. Patient currently on 1:1 sitter. Patient had a 
debridement done on his left heel. Dressing intact. Continues on his Ativan taper and 
tolerating taper well. Last CIWA is 14. Pt received PRN Maalox, Tylenol & Robaxin during day 
shift and were effective per report. Encourage pt to increase fluid intake. Educated patient 
of current plan of care for the night. Safety measures in place. Will continue to monitor 
patient.

## 2018-03-30 VITALS — DIASTOLIC BLOOD PRESSURE: 98 MMHG | SYSTOLIC BLOOD PRESSURE: 140 MMHG

## 2018-03-30 VITALS — SYSTOLIC BLOOD PRESSURE: 118 MMHG | DIASTOLIC BLOOD PRESSURE: 84 MMHG

## 2018-03-30 VITALS — DIASTOLIC BLOOD PRESSURE: 65 MMHG | SYSTOLIC BLOOD PRESSURE: 105 MMHG

## 2018-03-30 VITALS — DIASTOLIC BLOOD PRESSURE: 92 MMHG | SYSTOLIC BLOOD PRESSURE: 144 MMHG

## 2018-03-30 VITALS — DIASTOLIC BLOOD PRESSURE: 96 MMHG | SYSTOLIC BLOOD PRESSURE: 123 MMHG

## 2018-03-30 VITALS — DIASTOLIC BLOOD PRESSURE: 90 MMHG | SYSTOLIC BLOOD PRESSURE: 139 MMHG

## 2018-03-30 RX ADMIN — Medication SCH EA: at 15:13

## 2018-03-30 RX ADMIN — AMLODIPINE BESYLATE SCH MG: 10 TABLET ORAL at 20:49

## 2018-03-30 RX ADMIN — VALSARTAN SCH MG: 160 TABLET ORAL at 08:26

## 2018-03-30 RX ADMIN — ALUMINUM HYDROXIDE, MAGNESIUM HYDROXIDE, AND SIMETHICONE PRN ML: 200; 200; 20 SUSPENSION ORAL at 08:26

## 2018-03-30 RX ADMIN — OLANZAPINE SCH MG: 2.5 TABLET ORAL at 20:49

## 2018-03-30 RX ADMIN — WARFARIN SODIUM SCH MG: 5 TABLET ORAL at 16:05

## 2018-03-30 RX ADMIN — ACETAMINOPHEN PRN MG: 325 TABLET ORAL at 20:49

## 2018-03-30 RX ADMIN — METHOCARBAMOL TABLETS PRN MG: 750 TABLET, COATED ORAL at 20:49

## 2018-03-30 RX ADMIN — Medication SCH EA: at 08:26

## 2018-03-30 RX ADMIN — Medication SCH EA: at 20:48

## 2018-03-30 RX ADMIN — FOLIC ACID SCH MG: 1 TABLET ORAL at 08:25

## 2018-03-30 RX ADMIN — Medication SCH MG: at 08:25

## 2018-03-30 RX ADMIN — ACETAMINOPHEN PRN MG: 325 TABLET ORAL at 07:41

## 2018-03-30 RX ADMIN — TRAZODONE HYDROCHLORIDE PRN MG: 50 TABLET ORAL at 20:49

## 2018-03-30 RX ADMIN — METOPROLOL SUCCINATE SCH MG: 23.75 TABLET, FILM COATED, EXTENDED RELEASE ORAL at 20:49

## 2018-03-30 RX ADMIN — THERA TABS SCH UDTAB: TAB at 08:25

## 2018-03-30 RX ADMIN — METHOCARBAMOL TABLETS PRN MG: 750 TABLET, COATED ORAL at 07:41

## 2018-03-30 RX ADMIN — CHLORTHALIDONE SCH MG: 25 TABLET ORAL at 08:25

## 2018-03-30 RX ADMIN — Medication SCH MG: at 16:06

## 2018-03-30 NOTE — NUR
PRN Robaxin 750mg PO, Tylenol 650mg PO administered for muscle spasms on lower back and pain 
7/10 at same site . Will continue to monitor

## 2018-03-30 NOTE — NUR
START OF SHIFT

Rcvd endorse from ongoing nurse, client is in bed, a/o x 4. client presents with flat 
affect, irritable, and anxious mood, flushed face, tremors, and clammy skin. He appears 
disheveled, unshaved, stating, "I do not feel like doing anything today, I am very tired."  
Client report restless legs, generalized body aches, irritability, headache, chills, and 
stomach upset. PRN Robaxin 750mg Po for muscle spasms, Maalox 30ml PO for heartburn, 
Trazodone for inability to sleep, slept 5 hrs. Client is on 1:1 sitter for safety. Encourage 
client to attend group therapy to learn skills to maintain sober. Encourage client to 
increase PO fluid as tolerated to facilitate detox. L heel with intact dressing, sp I&D 
03/29/18. Last  CIWA 10 @ 0400. Call light within reach. Will continue to monitor.

## 2018-03-30 NOTE — NUR
CN notified of culture wound results GRAM STAIN: No WBC seen, No organisms seen. 

WOUND CULTURE  Preliminary  NO GROWTH

## 2018-03-30 NOTE — NUR
PRN Reassessment

Patient still awake at this time. Patient verbalized decreased in pain on his right heel 
from 8/10 to 5/10 and body aches from 8/10 to 4/10 after medication administration. Pt in 
bed at this time and appears comfortable. No facial grimacing noted. Pt continues on a 1:1 
sitter. Safety precautions in place. Will continue to monitor patient.

## 2018-03-30 NOTE — NUR
Reassess PRN Robaxin 750mg, Tylenol 650mg, client reports relief from muscle spasms on lower 
back and pain 2/10, but tolerable.

## 2018-03-30 NOTE — NUR
PRN Toradol 30mg IM administered to R thigh for pain on R toe 10/10. Client has hx of gout, 
colchicine 0.6mg PO administered.

## 2018-03-30 NOTE — NUR
END OF SHIFT

Endorse client to incoming nurse, client is in bed, a/o x 4. client continues to presents 
with anxious mood, flat affect, and clammy skin. Client report restless legs, chills, and 
decreased appetite. PRN's given and noted per protocol, see emar. Client continues to be on 
1:1 sitter. Client was not compliant with group therapy. Last CIWA 11 @ 1600. Client 
consumed ~75% of meals. Adequate PO fluid intake 2550mL, void x 4, stool x1. Call light 
within reach. Will continue to monitor.

## 2018-03-30 NOTE — NUR
End of Shift Note:

Patient is a 58 y.o male admitted for medically supervised withdrawal from ETOH. Pt 
continues on a Ativan taper and tolerating well. Vitals noted WNL with no abnormalities 
noted. Continue to closely monitor signs and symptoms of withdrawal. Last CIWA 10. Pt 
received PRN Trazodone, Robaxin and Maalox during my shift and were effective. Pt remained 
compliant with medications and treatment. Pt continues on a 1:1 for safety. Pt had a 
debridement done on his left heel, dressing intact. Patient still asleep at this time and 
appears comfortable. Pt slept for a total of 5 hours. Fluid intake is 355ml. Voided 5xl with 
no bowel movement noted. All needs attended & met. Safety measures in place. Will endorse pt 
to day shift nurse.

## 2018-03-30 NOTE — NUR
PRN Trazodone/Robaxin/Tylenol

Patient complained of 8/10 pain on his right heel d/t gout flareups and also reports 
generalized body aches. Pt complains of unable to fall asleep d/t the pain and discomfort 
and requesting for sleep medication. PRN Trazodone, Robaxin & Tylenol administered as 
ordered. Will monitor for effectiveness of medication.

## 2018-03-30 NOTE — NUR
Start of Shift Note:

Patient is a 58 y.o male admitted on 03/26/18 for medically supervised withdrawal from ETOH. 
Patient is alert & oriented x4. Patient noted with an anxious/irritable mood, has flushed 
and clammy skin. Patient presented with 8/10 pain on his right heel d/t gout flareup, pt 
also reports generalized body aches, noted with fine tremors and has anxiety. Patient uses a 
cane to ambulate. Patient currently on 1:1 sitter. Patient has an an excision debridement 
done on his left foot. Dressing noted to be clean, dry and intact. Pt completed his Ativan 
taper and is scheduled to be discharge tomorrow to [facility]. Last CIWA is 11. Pt received 
PRN Maalox, Tylenol & Robaxin during day shift and were effective per report. Encourage pt 
to increase fluid intake. Educated patient of current plan of care for the night. Safety 
measures in place. Will continue to monitor patient.

## 2018-03-31 VITALS — SYSTOLIC BLOOD PRESSURE: 108 MMHG | DIASTOLIC BLOOD PRESSURE: 64 MMHG

## 2018-03-31 VITALS — SYSTOLIC BLOOD PRESSURE: 142 MMHG | DIASTOLIC BLOOD PRESSURE: 90 MMHG

## 2018-03-31 RX ADMIN — ACETAMINOPHEN PRN MG: 325 TABLET ORAL at 05:20

## 2018-03-31 RX ADMIN — CHLORTHALIDONE SCH MG: 25 TABLET ORAL at 08:24

## 2018-03-31 RX ADMIN — VALSARTAN SCH MG: 160 TABLET ORAL at 08:28

## 2018-03-31 RX ADMIN — THERA TABS SCH UDTAB: TAB at 08:25

## 2018-03-31 RX ADMIN — Medication SCH EA: at 08:29

## 2018-03-31 RX ADMIN — Medication SCH MG: at 08:24

## 2018-03-31 RX ADMIN — FOLIC ACID SCH MG: 1 TABLET ORAL at 08:24

## 2018-03-31 RX ADMIN — Medication SCH MG: at 08:25

## 2018-03-31 NOTE — NUR
Start of shift- Patient is a 57 y/o male admitted for medically supervised withdrawal from 
ETOH. Pt completed his Ativan taper and is scheduled to be discharge today to [facility]. Pt 
A&OX4. Denies c/o N/V/D. C/O pain in back and headache #5/10. Continue to closely monitor 
signs and symptoms of withdrawal. Last CIWA 8 at 0500. Last night Pt received PRN Trazodone, 
Robaxin, Tylenol 2x, & Hydralazine. Pt remained compliant with medications and treatment. Pt 
continues on a 1:1 for safety. Dressing intact on his left heel. Pt slept for a total of 6 
hours. Pt reports NKA, FULL CODE. All safety precautions in place.  Call light within reach. 
Will continue to monitor for withdrawal symptoms.

## 2018-03-31 NOTE — NUR
PRN Reassessment

Pt verbalized improved headaache after medication administration. Vitals taken. B/P 121/72, 
OR 82, O2Sat 96% RR 16. Patient in bed and appears comfortable. Safety measures in place. 
Will continue to monitor patient.

## 2018-03-31 NOTE — NUR
Discharge Note

Pt is in stable condition, VS WNL, Pt denies any suicidal or homicidal ideations. Left heel 
has wound 1.5 cm. Before discharge cleaned wound with NS, pat dry and applied lidocaine gel 
around wound, apply gauze pad, then wrap with Kerlex. All discharge paperwork signed and 
dated. Pt was discharged from [facility] on 3/31/2018 at 0935. Pt left the 
building with all his medications, belongings and prescriptions. MD notified.

## 2018-03-31 NOTE — NUR
End of Shift Note:

Patient is a 58 y.o male admitted for medically supervised withdrawal from ETOH. Pt 
completed his Ativan taper and is scheduled to be discharge today to [facility]. Vitals 
stable with no abnormalities noted. Continue to closely monitor signs and symptoms of 
withdrawal. Last CIWA 8. Pt received PRN Trazodone, Robaxin, Tylenol 2x, & Hydralazine 
during my shift and were effective. Pt remained compliant with medications and treatment. Pt 
continues on a 1:1 for safety. Dressing intact on his left heel. Patient still asleep at 
this time and appears comfortable. Pt slept for a total of 6 hours. Fluid intake is 1600ml. 
Voided 5x with no bowel movement noted. All needs attended & met. Safety measures in place. 
Will endorse pt to day shift nurse.

## 2018-03-31 NOTE — NUR
PRN Hydralazine & Tylenol

Patient complains of severe headache, pt noted with restlessness and facial grimacing. 
Vitals taken. B/P 161/107, VA 92. PRN Hydralazine & Tylenol administered as ordered. Will 
continue to monitor patient.
